# Patient Record
Sex: FEMALE | Race: WHITE
[De-identification: names, ages, dates, MRNs, and addresses within clinical notes are randomized per-mention and may not be internally consistent; named-entity substitution may affect disease eponyms.]

---

## 2019-03-06 ENCOUNTER — HOSPITAL ENCOUNTER (EMERGENCY)
Dept: HOSPITAL 47 - EC | Age: 54
Discharge: OTHER INSTITUTION | End: 2019-03-06
Payer: COMMERCIAL

## 2019-03-06 VITALS — HEART RATE: 72 BPM | SYSTOLIC BLOOD PRESSURE: 180 MMHG | DIASTOLIC BLOOD PRESSURE: 110 MMHG

## 2019-03-06 VITALS — TEMPERATURE: 98.6 F | RESPIRATION RATE: 18 BRPM

## 2019-03-06 DIAGNOSIS — G45.9: Primary | ICD-10-CM

## 2019-03-06 DIAGNOSIS — F17.200: ICD-10-CM

## 2019-03-06 DIAGNOSIS — R29.700: ICD-10-CM

## 2019-03-06 DIAGNOSIS — I10: ICD-10-CM

## 2019-03-06 DIAGNOSIS — Z79.899: ICD-10-CM

## 2019-03-06 LAB
ALBUMIN SERPL-MCNC: 4.5 G/DL (ref 3.5–5)
ALP SERPL-CCNC: 59 U/L (ref 38–126)
ALT SERPL-CCNC: 41 U/L (ref 9–52)
ANION GAP SERPL CALC-SCNC: 9 MMOL/L
APTT BLD: 24.9 SEC (ref 22–30)
AST SERPL-CCNC: 22 U/L (ref 14–36)
BASOPHILS # BLD AUTO: 0.1 K/UL (ref 0–0.2)
BASOPHILS NFR BLD AUTO: 1 %
BUN SERPL-SCNC: 13 MG/DL (ref 7–17)
CALCIUM SPEC-MCNC: 10 MG/DL (ref 8.4–10.2)
CHLORIDE SERPL-SCNC: 104 MMOL/L (ref 98–107)
CO2 SERPL-SCNC: 27 MMOL/L (ref 22–30)
EOSINOPHIL # BLD AUTO: 0.1 K/UL (ref 0–0.7)
EOSINOPHIL NFR BLD AUTO: 2 %
ERYTHROCYTE [DISTWIDTH] IN BLOOD BY AUTOMATED COUNT: 4.86 M/UL (ref 3.8–5.4)
ERYTHROCYTE [DISTWIDTH] IN BLOOD: 12.7 % (ref 11.5–15.5)
GLUCOSE SERPL-MCNC: 100 MG/DL (ref 74–99)
HCT VFR BLD AUTO: 44.1 % (ref 34–46)
HGB BLD-MCNC: 14.9 GM/DL (ref 11.4–16)
INR PPP: 0.9 (ref ?–1.2)
LYMPHOCYTES # SPEC AUTO: 2.3 K/UL (ref 1–4.8)
LYMPHOCYTES NFR SPEC AUTO: 36 %
MCH RBC QN AUTO: 30.7 PG (ref 25–35)
MCHC RBC AUTO-ENTMCNC: 33.8 G/DL (ref 31–37)
MCV RBC AUTO: 90.8 FL (ref 80–100)
MONOCYTES # BLD AUTO: 0.3 K/UL (ref 0–1)
MONOCYTES NFR BLD AUTO: 4 %
NEUTROPHILS # BLD AUTO: 3.5 K/UL (ref 1.3–7.7)
NEUTROPHILS NFR BLD AUTO: 55 %
PLATELET # BLD AUTO: 222 K/UL (ref 150–450)
POTASSIUM SERPL-SCNC: 4.5 MMOL/L (ref 3.5–5.1)
PROT SERPL-MCNC: 7.7 G/DL (ref 6.3–8.2)
PT BLD: 9.9 SEC (ref 9–12)
SODIUM SERPL-SCNC: 140 MMOL/L (ref 137–145)
WBC # BLD AUTO: 6.4 K/UL (ref 3.8–10.6)

## 2019-03-06 PROCEDURE — 85730 THROMBOPLASTIN TIME PARTIAL: CPT

## 2019-03-06 PROCEDURE — 70450 CT HEAD/BRAIN W/O DYE: CPT

## 2019-03-06 PROCEDURE — 36415 COLL VENOUS BLD VENIPUNCTURE: CPT

## 2019-03-06 PROCEDURE — 93005 ELECTROCARDIOGRAM TRACING: CPT

## 2019-03-06 PROCEDURE — 99285 EMERGENCY DEPT VISIT HI MDM: CPT

## 2019-03-06 PROCEDURE — 96361 HYDRATE IV INFUSION ADD-ON: CPT

## 2019-03-06 PROCEDURE — 96360 HYDRATION IV INFUSION INIT: CPT

## 2019-03-06 PROCEDURE — 71046 X-RAY EXAM CHEST 2 VIEWS: CPT

## 2019-03-06 PROCEDURE — 84484 ASSAY OF TROPONIN QUANT: CPT

## 2019-03-06 PROCEDURE — 85610 PROTHROMBIN TIME: CPT

## 2019-03-06 PROCEDURE — 85025 COMPLETE CBC W/AUTO DIFF WBC: CPT

## 2019-03-06 PROCEDURE — 80053 COMPREHEN METABOLIC PANEL: CPT

## 2019-03-06 NOTE — CT
EXAMINATION TYPE: CT brain wo con

 

DATE OF EXAM: 3/6/2019

 

COMPARISON: None

 

INDICATION: Dizziness, elevated BP, numbness around lips, Lt arm numbness

 

DLP: 1074.4 mGycm, Automated exposure control for dose reduction was used.

 

CONTRAST: None

 

CT of the brain is performed utilizing 3 mm thick sections through the posterior fossa and 3 mm thick
 sections through the remaining calvarium.  Study is performed within 24 hours of arrival to the hosp
ital. 

 

No abnormal hyperdensity is present to suggest an acute intracranial hemorrhage.

No mass lesion is evident.

No acute infarcts are evident.

Ventricles and sulci are appropriate for the patient age.  

There is a retention cyst within the right maxillary sinus. No suspicious air-fluid levels are presen
t. Remaining paranasal sinuses and mastoid air cells are clear.

 

IMPRESSIONS:

1.   Normal CT Brain

2. Retention cyst right maxillary sinus

## 2019-03-06 NOTE — ED
General Adult HPI





- General


Chief complaint: Neuro Symptoms/Deficit


Stated complaint: Numbness around mouth/high blood pressure


Time Seen by Provider: 03/06/19 10:55


Source: patient, RN notes reviewed, old records reviewed


Mode of arrival: ambulatory


Limitations: no limitations





- History of Present Illness


Initial comments: 





53-year-old female presenting for evaluation of elevated blood pressure.  

Patient went to the dentist yesterday, had blood pressure checked noted to be 

quite elevated 200 systolic.  Patient has no known history of hypertension, not 

currently on any medications.  She has no chronic medical problems.  This 

morning the patient did notice a knot feeling in her left arm and around her 

lips.  Denies numbness, denies tingling.  States it just feels different.  No 

pain.  No weakness.  Denies any focal weakness or numbness.  Denies headache.  

Denies vision changes.  Denies gait abnormalities.  No chest pain or dyspnea.  

No abdominal pain.  No nausea or vomiting.





- Related Data


 Home Medications











 Medication  Instructions  Recorded  Confirmed


 


Ascorbic Acid [Vitamin C] 500 mg PO DAILY 03/06/19 03/06/19


 


Ibuprofen [Motrin Ib] 400 mg PO Q6H PRN 03/06/19 03/06/19


 


Ubidecarenone [Co Q-10] 100 mg PO DAILY 03/06/19 03/06/19


 


Vitamin B Complex 1 cap PO DAILY 03/06/19 03/06/19


 


Vitamin E 100 unit PO DAILY 03/06/19 03/06/19











 Allergies











Allergy/AdvReac Type Severity Reaction Status Date / Time


 


No Known Allergies Allergy   Verified 03/06/19 10:58














Review of Systems


ROS Statement: 


Those systems with pertinent positive or pertinent negative responses have been 

documented in the HPI.





ROS Other: All systems not noted in ROS Statement are negative.





Past Medical History


Past Medical History: No Reported History


History of Any Multi-Drug Resistant Organisms: None Reported


Past Surgical History: No Surgical Hx Reported


Past Psychological History: No Psychological Hx Reported


Smoking Status: Current every day smoker


Past Alcohol Use History: None Reported


Past Drug Use History: None Reported





General Exam


Limitations: no limitations


General appearance: alert, in no apparent distress


Head exam: Present: atraumatic, normocephalic


Eye exam: Present: normal appearance, PERRL, EOMI.  Absent: nystagmus


ENT exam: Present: normal exam


Neck exam: Present: normal inspection.  Absent: tenderness, meningismus


Respiratory exam: Present: normal lung sounds bilaterally.  Absent: respiratory 

distress, wheezes


Cardiovascular Exam: Present: regular rate, normal rhythm


GI/Abdominal exam: Present: soft.  Absent: distended, tenderness, guarding


Extremities exam: Present: normal inspection, normal capillary refill.  Absent: 

pedal edema


Neurological exam: Present: alert, oriented X3, CN II-XII intact, normal gait.  

Absent: motor sensory deficit


  ** Expanded


Neurological exam: Present: protecting the airway.  Absent: ataxia, receptive 

aphasia, expressive aphasia


Patient oriented to: Present: person, place, time


Speech: Present: fluid speech


Cranial nerves: EOM's Intact: Normal, Gag Reflex: Normal, Tongue Deviation: 

Normal, Nystagmus: Normal, Facial Sensation: Normal, Facial Palsy with Forehead 

Movement: Normal, Facial Palsy without Forehead Movement: Normal


Cerebellar function: Finger to Nose: Normal, Romberg: Normal


Upper motor neuron: Wojciech Neglect: Normal, Pronator Drift: Normal


Sensory exam: Upper Extremity Light Touch: Normal, Lower Extremity Light Touch: 

Normal


Motor strength exam: RUE: 5, LUE: 5, RLE: 5, LLE: 5


Eye Response: (4) open spontaneously


Motor Response: (6) obeys commands


Verbal Response: (5) oriented


Psychiatric exam: Present: normal affect, normal mood


Skin exam: Present: warm, dry, intact.  Absent: cyanosis, diaphoretic





Course


 Vital Signs











  03/06/19 03/06/19 03/06/19





  10:28 11:22 12:02


 


Temperature 98.6 F  


 


Pulse Rate 71 67 64


 


Respiratory 18 18 18





Rate   


 


Blood Pressure 209/111 187/109 168/102


 


O2 Sat by Pulse 98 100 100





Oximetry   














- Reevaluation(s)


Reevaluation #1: 





03/06/19 13:10


Patient initially presenting with periorbital paresthesia, left arm paresthesia 

and hypertension.  On reevaluation, left arm symptoms have improved, NIH is 0 

at the time of my initial evaluation and 0 at the time of reevaluation, blood 

pressure improved.





EKG Findings





- EKG Comments:


EKG Findings:: EKG: Normal sinus rhythm, LVH, rate of 69, DE interval 150, QRS 

duration 82, , no ST segment changes





Medical Decision Making





- Medical Decision Making





53-year-old female with no significant past medical history presenting for 

evaluation of hypertension and left arm paresthesia, no numbness, no weakness, 

nonfocal neurologic exam with NIH is 0.  Head CT obtained, negative for 

intracranial hemorrhage or mass effect, normal CBC, normal CMP, negative 

troponin, chest x-ray obtained, no cardiomegaly, no acute process.  Patient's 

symptoms concerning for accelerated hypertension with TIA.  Patient was given 

aspirin and Norvasc in the emergency department.  A she will be transferred for 

further evaluation and treatment of both hypertension and evaluation of TIA and 

CVA.  Case discussed with Dr. Gallego, at [facility], will  

except transfer.





- Lab Data


Result diagrams: 


 03/06/19 11:20





 03/06/19 11:20


 Lab Results











  03/06/19 03/06/19 03/06/19 Range/Units





  11:20 11:20 11:20 


 


WBC  6.4    (3.8-10.6)  k/uL


 


RBC  4.86    (3.80-5.40)  m/uL


 


Hgb  14.9    (11.4-16.0)  gm/dL


 


Hct  44.1    (34.0-46.0)  %


 


MCV  90.8    (80.0-100.0)  fL


 


MCH  30.7    (25.0-35.0)  pg


 


MCHC  33.8    (31.0-37.0)  g/dL


 


RDW  12.7    (11.5-15.5)  %


 


Plt Count  222    (150-450)  k/uL


 


Neutrophils %  55    %


 


Lymphocytes %  36    %


 


Monocytes %  4    %


 


Eosinophils %  2    %


 


Basophils %  1    %


 


Neutrophils #  3.5    (1.3-7.7)  k/uL


 


Lymphocytes #  2.3    (1.0-4.8)  k/uL


 


Monocytes #  0.3    (0-1.0)  k/uL


 


Eosinophils #  0.1    (0-0.7)  k/uL


 


Basophils #  0.1    (0-0.2)  k/uL


 


PT    9.9  (9.0-12.0)  sec


 


INR    0.9  (<1.2)  


 


APTT    24.9  (22.0-30.0)  sec


 


Sodium   140   (137-145)  mmol/L


 


Potassium   4.5   (3.5-5.1)  mmol/L


 


Chloride   104   ()  mmol/L


 


Carbon Dioxide   27   (22-30)  mmol/L


 


Anion Gap   9   mmol/L


 


BUN   13   (7-17)  mg/dL


 


Creatinine   0.56   (0.52-1.04)  mg/dL


 


Est GFR (CKD-EPI)AfAm   >90   (>60 ml/min/1.73 sqM)  


 


Est GFR (CKD-EPI)NonAf   >90   (>60 ml/min/1.73 sqM)  


 


Glucose   100 H   (74-99)  mg/dL


 


Calcium   10.0   (8.4-10.2)  mg/dL


 


Total Bilirubin   0.6   (0.2-1.3)  mg/dL


 


AST   22   (14-36)  U/L


 


ALT   41   (9-52)  U/L


 


Alkaline Phosphatase   59   ()  U/L


 


Troponin I     (0.000-0.034)  ng/mL


 


Total Protein   7.7   (6.3-8.2)  g/dL


 


Albumin   4.5   (3.5-5.0)  g/dL














  03/06/19 Range/Units





  11:20 


 


WBC   (3.8-10.6)  k/uL


 


RBC   (3.80-5.40)  m/uL


 


Hgb   (11.4-16.0)  gm/dL


 


Hct   (34.0-46.0)  %


 


MCV   (80.0-100.0)  fL


 


MCH   (25.0-35.0)  pg


 


MCHC   (31.0-37.0)  g/dL


 


RDW   (11.5-15.5)  %


 


Plt Count   (150-450)  k/uL


 


Neutrophils %   %


 


Lymphocytes %   %


 


Monocytes %   %


 


Eosinophils %   %


 


Basophils %   %


 


Neutrophils #   (1.3-7.7)  k/uL


 


Lymphocytes #   (1.0-4.8)  k/uL


 


Monocytes #   (0-1.0)  k/uL


 


Eosinophils #   (0-0.7)  k/uL


 


Basophils #   (0-0.2)  k/uL


 


PT   (9.0-12.0)  sec


 


INR   (<1.2)  


 


APTT   (22.0-30.0)  sec


 


Sodium   (137-145)  mmol/L


 


Potassium   (3.5-5.1)  mmol/L


 


Chloride   ()  mmol/L


 


Carbon Dioxide   (22-30)  mmol/L


 


Anion Gap   mmol/L


 


BUN   (7-17)  mg/dL


 


Creatinine   (0.52-1.04)  mg/dL


 


Est GFR (CKD-EPI)AfAm   (>60 ml/min/1.73 sqM)  


 


Est GFR (CKD-EPI)NonAf   (>60 ml/min/1.73 sqM)  


 


Glucose   (74-99)  mg/dL


 


Calcium   (8.4-10.2)  mg/dL


 


Total Bilirubin   (0.2-1.3)  mg/dL


 


AST   (14-36)  U/L


 


ALT   (9-52)  U/L


 


Alkaline Phosphatase   ()  U/L


 


Troponin I  <0.012  (0.000-0.034)  ng/mL


 


Total Protein   (6.3-8.2)  g/dL


 


Albumin   (3.5-5.0)  g/dL














Disposition


Clinical Impression: 


 Transient cerebral ischemia, Hypertension





Disposition: OTHER INSTITUTION NOT DEFINED


Condition: Stable


Is patient prescribed a controlled substance at d/c from ED?: No


Referrals: 


Colten Rojo DO [Primary Care Provider] - 1-2 days


Time of Disposition: 13:12





- Out of Hospital Transfer - Req. Specs


Out of Hospital Transfer - Requested Specifics: Other Emergency Center (

Transfer to [facility])

## 2019-03-06 NOTE — XR
EXAMINATION TYPE: XR chest 2V

 

DATE OF EXAM: 3/6/2019

 

COMPARISON: NONE

 

HISTORY: Facial numbness and weakness. History of hypertension.

 

TECHNIQUE:  Frontal and lateral views of the chest are obtained.

 

FINDINGS: Overlying EKG leads are seen.  There is some chronic parenchymal change in the apices witho
ut focal air space opacity, pleural effusion, or pneumothorax seen.  The cardiac silhouette size is w
ithin normal limits with ectatic descending thoracic aorta.   The osseous structures are intact.

 

IMPRESSION:  No acute cardiopulmonary process.

## 2023-05-15 ENCOUNTER — HOSPITAL ENCOUNTER (OUTPATIENT)
Dept: HOSPITAL 47 - RADXRYALE | Age: 58
Discharge: HOME | End: 2023-05-15
Payer: COMMERCIAL

## 2023-05-15 DIAGNOSIS — M25.551: Primary | ICD-10-CM

## 2023-05-15 DIAGNOSIS — M54.32: ICD-10-CM

## 2023-05-15 DIAGNOSIS — R22.43: ICD-10-CM

## 2023-05-15 PROCEDURE — 73502 X-RAY EXAM HIP UNI 2-3 VIEWS: CPT

## 2023-05-15 NOTE — XR
EXAMINATION TYPE: XR Hip Complete RT

 

DATE OF EXAM: 5/15/2023

 

COMPARISON: None

 

HISTORY: Pain

 

TECHNIQUE: 2 view right hip

 

FINDINGS: Femoral head articulates with the acetabulum. No acute fracture or dislocation is evident. 
Some degenerative change may be at the symphysis pubis. Right sacroiliac joint within the field-of-vi
ew has mild degenerative change.

 

Follow-up exams can be performed 7-10 days of acute trauma for continued pain

 

IMPRESSION:

1.  No acute osseous abnormality right